# Patient Record
Sex: FEMALE | Race: WHITE | Employment: UNEMPLOYED | URBAN - METROPOLITAN AREA
[De-identification: names, ages, dates, MRNs, and addresses within clinical notes are randomized per-mention and may not be internally consistent; named-entity substitution may affect disease eponyms.]

---

## 2017-11-28 ENCOUNTER — HOSPITAL ENCOUNTER (EMERGENCY)
Age: 32
Discharge: HOME | End: 2017-11-28
Attending: EMERGENCY MEDICINE
Payer: MEDICARE

## 2017-11-28 ENCOUNTER — APPOINTMENT (OUTPATIENT)
Dept: CT IMAGING | Age: 32
End: 2017-11-28
Attending: EMERGENCY MEDICINE
Payer: MEDICARE

## 2017-11-28 VITALS
BODY MASS INDEX: 37.69 KG/M2 | HEIGHT: 68 IN | HEART RATE: 72 BPM | SYSTOLIC BLOOD PRESSURE: 195 MMHG | RESPIRATION RATE: 18 BRPM | OXYGEN SATURATION: 100 % | WEIGHT: 248.68 LBS | DIASTOLIC BLOOD PRESSURE: 135 MMHG | TEMPERATURE: 98.4 F

## 2017-11-28 DIAGNOSIS — S09.90XA CLOSED HEAD INJURY, INITIAL ENCOUNTER: Primary | ICD-10-CM

## 2017-11-28 DIAGNOSIS — S09.8XXA BLUNT HEAD TRAUMA, INITIAL ENCOUNTER: ICD-10-CM

## 2017-11-28 PROCEDURE — 72125 CT NECK SPINE W/O DYE: CPT

## 2017-11-28 PROCEDURE — 74011250637 HC RX REV CODE- 250/637: Performed by: EMERGENCY MEDICINE

## 2017-11-28 PROCEDURE — 70450 CT HEAD/BRAIN W/O DYE: CPT

## 2017-11-28 PROCEDURE — 99284 EMERGENCY DEPT VISIT MOD MDM: CPT

## 2017-11-28 PROCEDURE — 99283 EMERGENCY DEPT VISIT LOW MDM: CPT

## 2017-11-28 RX ORDER — BUTALBITAL, ACETAMINOPHEN AND CAFFEINE 300; 40; 50 MG/1; MG/1; MG/1
1 CAPSULE ORAL
Qty: 10 CAP | Refills: 0 | Status: SHIPPED | OUTPATIENT
Start: 2017-11-28

## 2017-11-28 RX ORDER — GABAPENTIN 300 MG/1
300 CAPSULE ORAL 2 TIMES DAILY
COMMUNITY

## 2017-11-28 RX ORDER — BUTALBITAL, ACETAMINOPHEN AND CAFFEINE 50; 325; 40 MG/1; MG/1; MG/1
2 TABLET ORAL
Status: COMPLETED | OUTPATIENT
Start: 2017-11-28 | End: 2017-11-28

## 2017-11-28 RX ORDER — METOCLOPRAMIDE 10 MG/1
10 TABLET ORAL
Qty: 12 TAB | Refills: 0 | Status: SHIPPED | OUTPATIENT
Start: 2017-11-28

## 2017-11-28 RX ORDER — HYDROCHLOROTHIAZIDE 25 MG/1
25 TABLET ORAL DAILY
COMMUNITY

## 2017-11-28 RX ADMIN — BUTALBITAL, ACETAMINOPHEN AND CAFFEINE 2 TABLET: 50; 325; 40 TABLET ORAL at 22:37

## 2017-11-28 NOTE — LETTER
[address] 
\[facility]\"" EMERGENCY DEPT 
[address] [place] [address]. [address] [id number] [phone] Work/School Note Date: 11/28/2017 To Whom It May concern: 
 
Skye Soto was seen and treated today in the emergency room by the following provider(s): 
Attending Provider: Tyrel Ames MD.   
 
Skye Soto may return to work on 11/30/17.  
 
Sincerely, 
 
 
 
 
Tyrel Ames MD

## 2017-11-29 NOTE — ED NOTES
Patient unable to void at this time stating \"I just went to the bathroom right before I got here. \" Patient states she will let RN know when she is able to provide urine sample. ER MD made aware patient is back in ED room at this time and is requesting medication from headache.

## 2017-11-29 NOTE — DISCHARGE INSTRUCTIONS
Learning About a Closed Head Injury  What is a closed head injury? A closed head injury happens when your head gets hit hard. The strong force of the blow causes your brain to shake in your skull. This movement can cause the brain to bruise, swell, or tear. Sometimes nerves or blood vessels also get damaged. This can cause bleeding in or around the brain. A concussion is a type of closed head injury. What are the symptoms? If you have a mild concussion, you may have a mild headache or feel \"not quite right. \" These symptoms are common. They usually go away over a few days to 4 weeks. But sometimes after a concussion, you feel like you can't function as well as before the injury. And you have new symptoms. This is called postconcussive syndrome. You may:  · Find it harder to solve problems, think, concentrate, or remember. · Have headaches. · Have changes in your sleep patterns, such as not being able to sleep or sleeping all the time. · Have changes in your personality. · Not be interested in your usual activities. · Feel angry or anxious without a clear reason. · Lose your sense of taste or smell. · Be dizzy, lightheaded, or unsteady. It may be hard to stand or walk. How is a closed head injury treated? Any person who may have a concussion needs to see a doctor. Some people have to stay in the hospital to be watched. Others can go home safely. If you go home, follow your doctor's instructions. He or she will tell you if you need someone to watch you closely for the next 24 hours or longer. Rest is the best treatment. Get plenty of sleep at night. And try to rest during the day. · Avoid activities that are physically or mentally demanding. These include housework, exercise, and schoolwork. And don't play video games, send text messages, or use the computer. You may need to change your school or work schedule to be able to avoid these activities.   · Ask your doctor when it's okay to drive, ride a bike, or operate machinery. · Take an over-the-counter pain medicine, such as acetaminophen (Tylenol), ibuprofen (Advil, Motrin), or naproxen (Aleve). Be safe with medicines. Read and follow all instructions on the label. · Check with your doctor before you use any other medicines for pain. · Do not drink alcohol or use illegal drugs. They can slow recovery. They can also increase your risk of getting a second head injury. Follow-up care is a key part of your treatment and safety. Be sure to make and go to all appointments, and call your doctor if you are having problems. It's also a good idea to know your test results and keep a list of the medicines you take. Where can you learn more? Go to http://annmarie-beth.info/. Enter E235 in the search box to learn more about \"Learning About a Closed Head Injury. \"  Current as of: October 14, 2016  Content Version: 11.4  © 8495-9206 [employer]. Care instructions adapted under license by [employer] (which disclaims liability or warranty for this information). If you have questions about a medical condition or this instruction, always ask your healthcare professional. [place] [address] any warranty or liability for your use of this information. Concussion: Care Instructions  Your Care Instructions    A concussion is a kind of injury to the brain. It happens when the head receives a hard blow. The impact can jar or shake the brain against the skull. This interrupts the brain's normal activities. Although you may have cuts or bruises on your head or face, you may have no other visible signs of a brain injury. In most cases, damage to the brain from a concussion can't be seen in tests such as a CT or MRI scan. For a few weeks, you may have low energy, dizziness, trouble sleeping, a headache, ringing in your ears, or nausea. You may also feel anxious, grumpy, or depressed.  You may have problems with memory and concentration. These symptoms are common after a concussion. They should slowly improve over time. Sometimes this takes weeks or even months. Someone who lives with you should know how to care for you. Please share this and all information with a caregiver who will be available to help if needed. Follow-up care is a key part of your treatment and safety. Be sure to make and go to all appointments, and call your doctor if you are having problems. It's also a good idea to know your test results and keep a list of the medicines you take. How can you care for yourself at home? Pain control  · Put ice or a cold pack on the part of your head that hurts for 10 to 20 minutes at a time. Put a thin cloth between the ice and your skin. · Be safe with medicines. Read and follow all instructions on the label. ¨ If the doctor gave you a prescription medicine for pain, take it as prescribed. ¨ If you are not taking a prescription pain medicine, ask your doctor if you can take an over-the-counter medicine. Recovery  · Follow your doctor's instructions. He or she will tell you if you need someone to watch you closely for the next 24 hours or longer. · Rest is the best way to recover from a concussion. You need to rest your body and your brain:  ¨ Get plenty of sleep at night. And take rest breaks during the day. ¨ Avoid activities that take a lot of physical or mental work. This includes housework, exercise, schoolwork, video games, text messaging, and using the computer. ¨ You may need to change your school or work schedule while you recover. ¨ Return to your normal activities slowly. Do not try to do too much at once. · Do not drink alcohol or use illegal drugs. Alcohol and illegal drugs can slow your recovery. And they can increase your risk of a second brain injury. · Avoid activities that could lead to another concussion. Follow your doctor's instructions for a gradual return to activity and sports.   · Ask your doctor when it's okay for you to drive a car, ride a bike, or operate machinery. How should you return to activity? Your return to sports or activity should be gradual. It should only begin when all symptoms of a concussion are gone, both while at rest and during exercise or exertion. Doctors and concussion specialists suggest steps to follow for returning to sports after a concussion. Use these steps as a guide. You should slowly progress through the following levels of activity:  1. No activity. This means complete physical and mental rest.  2. Light aerobic activity. This can include walking, swimming, or other exercise at less than 70% of maximum heart rate. No resistance training is included in this step. 3. Sport-specific exercise. This includes running drills or skating drills (depending on the sport), but no head impact. 4. Noncontact training drills. This includes more complex training drills such as passing. The athlete may also begin light resistance training. 5. Full-contact practice. The athlete can participate in normal training. 6. Return to normal game play. This is the final step and allows the athlete to join in normal game play. Watch and keep track of your progress. It should take at least 6 days for you to go from light activity to normal game play. Make sure that you can stay at each new level of activity for at least 24 hours without symptoms, or as long as your doctor says, before doing more. If one or more symptoms come back, return to a lower level of activity for at least 24 hours. Don't move on until all symptoms are gone. When should you call for help? Call 911 anytime you think you may need emergency care. For example, call if:  ? · You have a seizure. ? · You passed out (lost consciousness). ? · You are confused or can't stay awake. ?Call your doctor now or seek immediate medical care if:  ? · You have new or worse vomiting. ? · You feel less alert.    ? · You have new weakness or numbness in any part of your body. ? Watch closely for changes in your health, and be sure to contact your doctor if:  ? · You do not get better as expected. ? · You have new symptoms, such as headaches, trouble concentrating, or changes in mood. Where can you learn more? Go to http://annmarie-beth.info/. Enter K576 in the search box to learn more about \"Concussion: Care Instructions. \"  Current as of: October 14, 2016  Content Version: 11.4  © 6156-3899 [employer]. Care instructions adapted under license by [employer] (which disclaims liability or warranty for this information).  If you have questions about a medical condition or this instruction, always ask your healthcare professional. [address] any warranty or liability for your use of this information.

## 2017-11-29 NOTE — ED NOTES
MD Devendra Rose gave verbal order to cancel urine order at this time and for headache medication. Order changes to follow.

## 2017-11-29 NOTE — ED NOTES
Discharge instructions reviewed with pt and copy given along with RX by ER MD Daiana Whatley. Patient ambulatory from ED accompanied by friend in no sign of distress or discomfort at this time.

## 2017-11-29 NOTE — ED PROVIDER NOTES
EMERGENCY DEPARTMENT HISTORY AND PHYSICAL EXAM      Date: 11/28/2017  Patient Name: Francie Agustin    History of Presenting Illness     Chief Complaint   Patient presents with    Head Injury     Pt reports a 10 ft piece of wood fell on her head today around 1800. Pt denies being on blood thinners. Pt reports since then, her head feels like pressure \"feels like a migraine\". Pt reports having N/V since and \"seeing double\". Pt ambulatory to [facility] with steady gait. History Provided By: Patient    HPI: Francie Agustin is a 28 y.o. female, pmhx Hypothyroid, who presents ambulatory to the ED c/o forehead contusion and headache s/p a wooden board 3x3x10 inches fell on her forehead from above. Pt notes associated mild photophobia, dizziness, nausea and vomiting She denies taking any medications for relief of symptoms or any other relieving or exacerbating factors. Pt specifically denies any fever, congestion, cough, shortness of breath, chest pain, abdominal pain, nausea, vomiting, diarrhea, dysuria, or urinary frequency. PCP: None    PMHx: Significant for Hypothyroid  PSHx: Significant for none  Social Hx: -tobacco, -EtOH, -Illicit Drugs    There are no other complaints, changes, or physical findings at this time. Current Outpatient Prescriptions   Medication Sig Dispense Refill    gabapentin (NEURONTIN) 300 mg capsule Take 300 mg by mouth two (2) times a day.  hydroCHLOROthiazide (HYDRODIURIL) 25 mg tablet Take 25 mg by mouth daily.  butalbital-acetaminophen-caff (FIORICET) -40 mg per capsule Take 1 Cap by mouth every four (4) hours as needed for Pain. Max Daily Amount: 6 Caps. 10 Cap 0    metoclopramide HCl (REGLAN) 10 mg tablet Take 1 Tab by mouth every six (6) hours as needed.  12 Tab 0       Past History     Past Medical History:  Past Medical History:   Diagnosis Date    Genital herpes, unspecified     pt reports \"maybe now\"     Thyroid activity decreased     as a teen hypothyroidism       Past Surgical History:  History reviewed. No pertinent surgical history. Family History:  Family History   Problem Relation Age of Onset    Muscular dystrophy Other        Social History:  Social History   Substance Use Topics    Smoking status: Never Smoker    Smokeless tobacco: Never Used    Alcohol use No       Allergies: Allergies   Allergen Reactions    Erythromycin Swelling    Penicillins Swelling    Sulfa (Sulfonamide Antibiotics) Swelling         Review of Systems   Review of Systems   Constitutional: Negative. Negative for fever. Eyes: Positive for photophobia. Respiratory: Negative. Negative for shortness of breath. Cardiovascular: Negative for chest pain. Gastrointestinal: Positive for nausea and vomiting. Negative for abdominal pain. Endocrine: Negative. Genitourinary: Negative. Negative for difficulty urinating, dysuria and hematuria. Musculoskeletal: Negative. Skin: Negative. Allergic/Immunologic: Negative. Neurological: Positive for dizziness and headaches. Psychiatric/Behavioral: Negative for suicidal ideas. All other systems reviewed and are negative. Physical Exam   Physical Exam   Constitutional: She is oriented to person, place, and time. She appears well-developed and well-nourished. No distress. HENT:   Head: Normocephalic. Head is with abrasion and with contusion. Nose: Nose normal.   Eyes: Conjunctivae and EOM are normal. No scleral icterus. Neck: Normal range of motion. No tracheal deviation present. Cardiovascular: Normal rate, regular rhythm, normal heart sounds and intact distal pulses. Exam reveals no friction rub. No murmur heard. Pulmonary/Chest: Effort normal and breath sounds normal. No stridor. No respiratory distress. She has no wheezes. She has no rales. Abdominal: Soft. Bowel sounds are normal. She exhibits no distension. There is no tenderness. There is no rebound.    Musculoskeletal: Normal range of motion. She exhibits no tenderness. Neurological: She is alert and oriented to person, place, and time. No cranial nerve deficit. Skin: Skin is warm and dry. No rash noted. She is not diaphoretic. Psychiatric: She has a normal mood and affect. Her speech is normal and behavior is normal. Judgment and thought content normal. Cognition and memory are normal.   Nursing note and vitals reviewed. Diagnostic Study Results     Labs -   No results found for this or any previous visit (from the past 12 hour(s)). Radiologic Studies -   CT SPINE CERV WO CONT   Final Result      CT HEAD WO CONT   Final Result        CT Results  (Last 48 hours)               11/28/17 2159  CT HEAD WO CONT Final result    Impression:  IMPRESSION:        Normal CT scan of the head               Narrative:  EXAM:  CT HEAD WO CONT   Clinical history: Mild head injury, piece of wood fell on head   INDICATION:   Head injury mild or moderate acute, no neurological deficit       COMPARISON: None. CONTRAST:  None. TECHNIQUE: Unenhanced CT of the head was performed using 5 mm images. Brain and   bone windows were generated. CT dose reduction was achieved through use of a   standardized protocol tailored for this examination and automatic exposure   control for dose modulation. FINDINGS:   The ventricles and sulci are normal in size, shape and configuration and   midline. There is no significant white matter disease. There is no intracranial   hemorrhage, extra-axial collection, mass, mass effect or midline shift. The   basilar cisterns are open. No acute infarct is identified. The bone windows   demonstrate no abnormalities. The visualized portions of the paranasal sinuses   and mastoid air cells are clear. 11/28/17 2159  CT SPINE CERV WO CONT Final result    Impression:  IMPRESSION:   No evidence of fracture or dislocation.                Narrative:  EXAM:  CT CERVICAL SPINE WITHOUT CONTRAST HISTORY: Blunt injury, please avoid fell on head   INDICATION:   blunt injury. COMPARISON: None. CONTRAST:  None. TECHNIQUE: Multislice helical CT of the cervical spine was performed without   intravenous contrast administration. Sagittal and coronal reconstructions were   generated. CT dose reduction was achieved through use of a standardized   protocol tailored for this examination and automatic exposure control for dose   modulation. FINDINGS:       The alignment is within normal limits. There is no fracture or subluxation. The   odontoid process is intact. The craniocervical junction is within normal limits. The prevertebral soft tissues are within normal limits. C2-C3:  There is no spinal canal or neural foraminal stenosis. C3-C4:  There is no spinal canal or neural foraminal stenosis. C4-C5:  There is no spinal canal or neural foraminal stenosis. C5-C6:  There is no spinal canal or neural foraminal stenosis. C6-C7:  There is no spinal canal or neural foraminal stenosis. C7-T1:  There is no spinal canal or neural foraminal stenosis. CXR Results  (Last 48 hours)    None            Medical Decision Making   I am the first provider for this patient. I reviewed the vital signs, available nursing notes, past medical history, past surgical history, family history and social history. Vital Signs-Reviewed the patient's vital signs. Patient Vitals for the past 12 hrs:   Temp Pulse Resp BP SpO2   11/28/17 2042 98.4 °F (36.9 °C) 72 18 (!) 195/135 100 %       Pulse Oximetry Analysis - 100% on RA    Cardiac Monitor:   Rate: 72 bpm  Rhythm: Normal Sinus Rhythm      Records Reviewed: Nursing Notes and Old Medical Records    Provider Notes (Medical Decision Making):     DDX:  Aidan Grahamer, contusion, concussion    Plan:  Head and c spine ct, analgesic    Impression:  Close head injury, blunt injury    ED Course:   Initial assessment performed.  The patients presenting problems have been discussed, and they are in agreement with the care plan formulated and outlined with them. I have encouraged them to ask questions as they arise throughout their visit. I reviewed our electronic medical record system for any past medical records that were available that may contribute to the patients current condition, the nursing notes and and vital signs from today's visit    Nursing notes will be reviewed as they become available in realtime while the pt has been in the ED. Anne Cardoza MD    I personally reviewed pt's imaging. Official read by radiology listed below. Anne Cardoza MD      10:41 PM  Progress note:  Discussed imaging findings with pt, specifically noting no evidence of intracranial hemorrhage or c spine injury. Pt will follow up as instructed. All questions have been answered, pt voiced understanding and agreement with plan. If narcotics were prescribed, pt was advised not to drive or operate heavy machinery. If abx were prescribed, pt advised that diarrhea and rash are possible side effects of the medications. Specific return precautions provided in addition to instructions for pt to return to the ED immediately should sx worsen at any time. Anne Cardoza MD      PLAN:  1. Current Discharge Medication List      START taking these medications    Details   butalbital-acetaminophen-caff (FIORICET) -40 mg per capsule Take 1 Cap by mouth every four (4) hours as needed for Pain. Max Daily Amount: 6 Caps. Qty: 10 Cap, Refills: 0      metoclopramide HCl (REGLAN) 10 mg tablet Take 1 Tab by mouth every six (6) hours as needed. Qty: 12 Tab, Refills: 0           2.    Follow-up Information     Follow up With Details Comments Contact Info    [facility] EMERGENCY DEPT  As needed [address] [place] [address]  [phone]    Your PCP  Schedule an appointment as soon as possible for a visit in 2 days          Return to ED if worse Disposition:    Discharge Note:  10:41 PM  The pt is ready for discharge. The pt's signs, symptoms, diagnosis, and discharge instructions have been discussed and pt has conveyed their understanding. The pt is to follow up as recommended or return to ER should their symptoms worsen. Plan has been discussed and pt is in agreement. Diagnosis     Clinical Impression:   1. Closed head injury, initial encounter    2. Blunt head trauma, initial encounter        Attestations: This note is prepared by Akua Benítez, acting as Scribe for Mendel Branch, MD Mendel Branch, MD : The scribe's documentation has been prepared under my direction and personally reviewed by me in its entirety. I confirm that the note above accurately reflects all work, treatment, procedures, and medical decision making performed by me. This note will not be viewable in [address]

## 2019-01-28 LAB
CHLAMYDIA, EXTERNAL: NORMAL
HBSAG, EXTERNAL: NORMAL
HIV, EXTERNAL: NONREACTIVE
N. GONORRHEA, EXTERNAL: NORMAL
RUBELLA, EXTERNAL: 1.58

## 2019-06-14 LAB — ANTIBODY SCREEN, EXTERNAL: NORMAL

## 2019-06-26 LAB — GRBS, EXTERNAL: NORMAL

## 2019-08-16 ENCOUNTER — HOSPITAL ENCOUNTER (INPATIENT)
Age: 34
LOS: 2 days | Discharge: HOME | End: 2019-08-18
Attending: OBSTETRICS & GYNECOLOGY | Admitting: OBSTETRICS & GYNECOLOGY
Payer: MEDICARE

## 2019-08-16 ENCOUNTER — ANESTHESIA EVENT (OUTPATIENT)
Dept: LABOR AND DELIVERY | Age: 34
End: 2019-08-16
Payer: MEDICARE

## 2019-08-16 ENCOUNTER — ANESTHESIA (OUTPATIENT)
Dept: LABOR AND DELIVERY | Age: 34
End: 2019-08-16
Payer: MEDICARE

## 2019-08-16 PROBLEM — Z98.891 PREVIOUS CESAREAN SECTION: Status: ACTIVE | Noted: 2019-08-16

## 2019-08-16 LAB
ABO + RH BLD: NORMAL
ALBUMIN SERPL-MCNC: 3.2 G/DL (ref 3.5–5)
ALBUMIN/GLOB SERPL: 0.7 {RATIO} (ref 1.1–2.2)
ALP SERPL-CCNC: 112 U/L (ref 45–117)
ALT SERPL-CCNC: 17 U/L (ref 12–78)
ANION GAP SERPL CALC-SCNC: 8 MMOL/L (ref 5–15)
AST SERPL-CCNC: 22 U/L (ref 15–37)
BASOPHILS # BLD: 0.1 K/UL (ref 0–0.1)
BASOPHILS NFR BLD: 1 % (ref 0–1)
BILIRUB SERPL-MCNC: 0.4 MG/DL (ref 0.2–1)
BLOOD GROUP ANTIBODIES SERPL: NORMAL
BUN SERPL-MCNC: 10 MG/DL (ref 6–20)
BUN/CREAT SERPL: 15 (ref 12–20)
CALCIUM SERPL-MCNC: 9.7 MG/DL (ref 8.5–10.1)
CHLORIDE SERPL-SCNC: 107 MMOL/L (ref 97–108)
CO2 SERPL-SCNC: 23 MMOL/L (ref 21–32)
CREAT SERPL-MCNC: 0.65 MG/DL (ref 0.55–1.02)
DIFFERENTIAL METHOD BLD: ABNORMAL
EOSINOPHIL # BLD: 0.2 K/UL (ref 0–0.4)
EOSINOPHIL NFR BLD: 2 % (ref 0–7)
ERYTHROCYTE [DISTWIDTH] IN BLOOD BY AUTOMATED COUNT: 14.6 % (ref 11.5–14.5)
GLOBULIN SER CALC-MCNC: 4.8 G/DL (ref 2–4)
GLUCOSE SERPL-MCNC: 87 MG/DL (ref 65–100)
HCT VFR BLD AUTO: 38.2 % (ref 35–47)
HGB BLD-MCNC: 12.5 G/DL (ref 11.5–16)
IMM GRANULOCYTES # BLD AUTO: 0.2 K/UL (ref 0–0.04)
IMM GRANULOCYTES NFR BLD AUTO: 1 % (ref 0–0.5)
LYMPHOCYTES # BLD: 2.1 K/UL (ref 0.8–3.5)
LYMPHOCYTES NFR BLD: 16 % (ref 12–49)
MCH RBC QN AUTO: 28.2 PG (ref 26–34)
MCHC RBC AUTO-ENTMCNC: 32.7 G/DL (ref 30–36.5)
MCV RBC AUTO: 86 FL (ref 80–99)
MONOCYTES # BLD: 0.9 K/UL (ref 0–1)
MONOCYTES NFR BLD: 7 % (ref 5–13)
NEUTS SEG # BLD: 10.1 K/UL (ref 1.8–8)
NEUTS SEG NFR BLD: 73 % (ref 32–75)
NRBC # BLD: 0 K/UL (ref 0–0.01)
NRBC BLD-RTO: 0 PER 100 WBC
PLATELET # BLD AUTO: 291 K/UL (ref 150–400)
PMV BLD AUTO: 11.9 FL (ref 8.9–12.9)
POTASSIUM SERPL-SCNC: 4.1 MMOL/L (ref 3.5–5.1)
PROT SERPL-MCNC: 8 G/DL (ref 6.4–8.2)
RBC # BLD AUTO: 4.44 M/UL (ref 3.8–5.2)
SODIUM SERPL-SCNC: 138 MMOL/L (ref 136–145)
SPECIMEN EXP DATE BLD: NORMAL
WBC # BLD AUTO: 13.6 K/UL (ref 3.6–11)

## 2019-08-16 PROCEDURE — 77010026065 HC OXYGEN MINIMUM MEDICAL AIR

## 2019-08-16 PROCEDURE — 85025 COMPLETE CBC W/AUTO DIFF WBC: CPT

## 2019-08-16 PROCEDURE — 76010000392 HC C SECN EA ADDL 0.5 HR: Performed by: OBSTETRICS & GYNECOLOGY

## 2019-08-16 PROCEDURE — 86900 BLOOD TYPING SEROLOGIC ABO: CPT

## 2019-08-16 PROCEDURE — 75410000003 HC RECOV DEL/VAG/CSECN EA 0.5 HR

## 2019-08-16 PROCEDURE — 74011250636 HC RX REV CODE- 250/636: Performed by: NURSE ANESTHETIST, CERTIFIED REGISTERED

## 2019-08-16 PROCEDURE — 65410000002 HC RM PRIVATE OB

## 2019-08-16 PROCEDURE — 75410000002 HC LABOR FEE PER 1 HR

## 2019-08-16 PROCEDURE — 74011250636 HC RX REV CODE- 250/636

## 2019-08-16 PROCEDURE — 76060000033 HC ANESTHESIA 1 TO 1.5 HR: Performed by: OBSTETRICS & GYNECOLOGY

## 2019-08-16 PROCEDURE — 74011000258 HC RX REV CODE- 258: Performed by: OBSTETRICS & GYNECOLOGY

## 2019-08-16 PROCEDURE — 76010000391 HC C SECN FIRST 1 HR: Performed by: OBSTETRICS & GYNECOLOGY

## 2019-08-16 PROCEDURE — 74011000250 HC RX REV CODE- 250: Performed by: NURSE ANESTHETIST, CERTIFIED REGISTERED

## 2019-08-16 PROCEDURE — 80053 COMPREHEN METABOLIC PANEL: CPT

## 2019-08-16 PROCEDURE — 36415 COLL VENOUS BLD VENIPUNCTURE: CPT

## 2019-08-16 PROCEDURE — 76060000078 HC EPIDURAL ANESTHESIA: Performed by: OBSTETRICS & GYNECOLOGY

## 2019-08-16 PROCEDURE — 74011250636 HC RX REV CODE- 250/636: Performed by: OBSTETRICS & GYNECOLOGY

## 2019-08-16 PROCEDURE — 77030007866 HC KT SPN ANES BBMI -B: Performed by: ANESTHESIOLOGY

## 2019-08-16 RX ORDER — ONDANSETRON 2 MG/ML
INJECTION INTRAMUSCULAR; INTRAVENOUS
Status: COMPLETED
Start: 2019-08-16 | End: 2019-08-16

## 2019-08-16 RX ORDER — SODIUM CHLORIDE 0.9 % (FLUSH) 0.9 %
5-40 SYRINGE (ML) INJECTION AS NEEDED
Status: DISCONTINUED | OUTPATIENT
Start: 2019-08-16 | End: 2019-08-16 | Stop reason: SDUPTHER

## 2019-08-16 RX ORDER — NALOXONE HYDROCHLORIDE 0.4 MG/ML
0.4 INJECTION, SOLUTION INTRAMUSCULAR; INTRAVENOUS; SUBCUTANEOUS AS NEEDED
Status: DISCONTINUED | OUTPATIENT
Start: 2019-08-16 | End: 2019-08-16 | Stop reason: SDUPTHER

## 2019-08-16 RX ORDER — DIPHENHYDRAMINE HYDROCHLORIDE 50 MG/ML
12.5 INJECTION, SOLUTION INTRAMUSCULAR; INTRAVENOUS
Status: DISCONTINUED | OUTPATIENT
Start: 2019-08-16 | End: 2019-08-18 | Stop reason: HOSPADM

## 2019-08-16 RX ORDER — EPHEDRINE SULFATE/0.9% NACL/PF 50 MG/5 ML
SYRINGE (ML) INTRAVENOUS AS NEEDED
Status: DISCONTINUED | OUTPATIENT
Start: 2019-08-16 | End: 2019-08-16 | Stop reason: HOSPADM

## 2019-08-16 RX ORDER — SODIUM CHLORIDE 0.9 % (FLUSH) 0.9 %
5-40 SYRINGE (ML) INJECTION EVERY 8 HOURS
Status: DISCONTINUED | OUTPATIENT
Start: 2019-08-16 | End: 2019-08-18 | Stop reason: HOSPADM

## 2019-08-16 RX ORDER — IBUPROFEN 400 MG/1
800 TABLET ORAL EVERY 8 HOURS
Status: DISCONTINUED | OUTPATIENT
Start: 2019-08-16 | End: 2019-08-18 | Stop reason: HOSPADM

## 2019-08-16 RX ORDER — OMEPRAZOLE 10 MG/1
10 CAPSULE, DELAYED RELEASE ORAL DAILY
COMMUNITY

## 2019-08-16 RX ORDER — BUPIVACAINE HYDROCHLORIDE 7.5 MG/ML
INJECTION, SOLUTION EPIDURAL; RETROBULBAR
Status: COMPLETED | OUTPATIENT
Start: 2019-08-16 | End: 2019-08-16

## 2019-08-16 RX ORDER — OXYTOCIN 10 [USP'U]/ML
INJECTION, SOLUTION INTRAMUSCULAR; INTRAVENOUS AS NEEDED
Status: DISCONTINUED | OUTPATIENT
Start: 2019-08-16 | End: 2019-08-16 | Stop reason: HOSPADM

## 2019-08-16 RX ORDER — ERGOCALCIFEROL 1.25 MG/1
50000 CAPSULE ORAL
COMMUNITY

## 2019-08-16 RX ORDER — ONDANSETRON 2 MG/ML
4 INJECTION INTRAMUSCULAR; INTRAVENOUS
Status: DISCONTINUED | OUTPATIENT
Start: 2019-08-16 | End: 2019-08-18 | Stop reason: HOSPADM

## 2019-08-16 RX ORDER — ZOLPIDEM TARTRATE 5 MG/1
5 TABLET ORAL
Status: DISCONTINUED | OUTPATIENT
Start: 2019-08-16 | End: 2019-08-18 | Stop reason: HOSPADM

## 2019-08-16 RX ORDER — SODIUM CHLORIDE, SODIUM LACTATE, POTASSIUM CHLORIDE, CALCIUM CHLORIDE 600; 310; 30; 20 MG/100ML; MG/100ML; MG/100ML; MG/100ML
1000 INJECTION, SOLUTION INTRAVENOUS CONTINUOUS
Status: DISCONTINUED | OUTPATIENT
Start: 2019-08-16 | End: 2019-08-16 | Stop reason: HOSPADM

## 2019-08-16 RX ORDER — SODIUM CHLORIDE, SODIUM LACTATE, POTASSIUM CHLORIDE, CALCIUM CHLORIDE 600; 310; 30; 20 MG/100ML; MG/100ML; MG/100ML; MG/100ML
125 INJECTION, SOLUTION INTRAVENOUS CONTINUOUS
Status: DISCONTINUED | OUTPATIENT
Start: 2019-08-16 | End: 2019-08-18 | Stop reason: HOSPADM

## 2019-08-16 RX ORDER — SIMETHICONE 80 MG
80 TABLET,CHEWABLE ORAL AS NEEDED
Status: DISCONTINUED | OUTPATIENT
Start: 2019-08-16 | End: 2019-08-18 | Stop reason: HOSPADM

## 2019-08-16 RX ORDER — KETOROLAC TROMETHAMINE 30 MG/ML
30 INJECTION, SOLUTION INTRAMUSCULAR; INTRAVENOUS
Status: ACTIVE | OUTPATIENT
Start: 2019-08-16 | End: 2019-08-17

## 2019-08-16 RX ORDER — SODIUM CHLORIDE 0.9 % (FLUSH) 0.9 %
5-40 SYRINGE (ML) INJECTION EVERY 8 HOURS
Status: DISCONTINUED | OUTPATIENT
Start: 2019-08-16 | End: 2019-08-16 | Stop reason: HOSPADM

## 2019-08-16 RX ORDER — CLINDAMYCIN PHOSPHATE 900 MG/50ML
900 INJECTION INTRAVENOUS
Status: COMPLETED | OUTPATIENT
Start: 2019-08-16 | End: 2019-08-16

## 2019-08-16 RX ORDER — NALOXONE HYDROCHLORIDE 0.4 MG/ML
0.2 INJECTION, SOLUTION INTRAMUSCULAR; INTRAVENOUS; SUBCUTANEOUS
Status: ACTIVE | OUTPATIENT
Start: 2019-08-16 | End: 2019-08-17

## 2019-08-16 RX ORDER — LABETALOL 200 MG/1
200 TABLET, FILM COATED ORAL EVERY 12 HOURS
Status: DISCONTINUED | OUTPATIENT
Start: 2019-08-17 | End: 2019-08-18 | Stop reason: HOSPADM

## 2019-08-16 RX ORDER — LABETALOL 200 MG/1
200 TABLET, FILM COATED ORAL 2 TIMES DAILY
Status: DISCONTINUED | OUTPATIENT
Start: 2019-08-17 | End: 2019-08-17

## 2019-08-16 RX ORDER — ONDANSETRON 2 MG/ML
INJECTION INTRAMUSCULAR; INTRAVENOUS AS NEEDED
Status: DISCONTINUED | OUTPATIENT
Start: 2019-08-16 | End: 2019-08-16 | Stop reason: HOSPADM

## 2019-08-16 RX ORDER — LABETALOL 200 MG/1
200 TABLET, FILM COATED ORAL 2 TIMES DAILY
COMMUNITY

## 2019-08-16 RX ORDER — KETOROLAC TROMETHAMINE 30 MG/ML
30 INJECTION, SOLUTION INTRAMUSCULAR; INTRAVENOUS
Status: DISCONTINUED | OUTPATIENT
Start: 2019-08-16 | End: 2019-08-16 | Stop reason: SDUPTHER

## 2019-08-16 RX ORDER — MORPHINE SULFATE 1 MG/ML
INJECTION, SOLUTION EPIDURAL; INTRATHECAL; INTRAVENOUS
Status: COMPLETED | OUTPATIENT
Start: 2019-08-16 | End: 2019-08-16

## 2019-08-16 RX ORDER — SODIUM CHLORIDE 0.9 % (FLUSH) 0.9 %
5-40 SYRINGE (ML) INJECTION AS NEEDED
Status: DISCONTINUED | OUTPATIENT
Start: 2019-08-16 | End: 2019-08-16 | Stop reason: HOSPADM

## 2019-08-16 RX ORDER — PHENYLEPHRINE HCL IN 0.9% NACL 0.4MG/10ML
SYRINGE (ML) INTRAVENOUS AS NEEDED
Status: DISCONTINUED | OUTPATIENT
Start: 2019-08-16 | End: 2019-08-16 | Stop reason: HOSPADM

## 2019-08-16 RX ORDER — SODIUM CHLORIDE, SODIUM LACTATE, POTASSIUM CHLORIDE, CALCIUM CHLORIDE 600; 310; 30; 20 MG/100ML; MG/100ML; MG/100ML; MG/100ML
INJECTION, SOLUTION INTRAVENOUS
Status: DISCONTINUED | OUTPATIENT
Start: 2019-08-16 | End: 2019-08-16 | Stop reason: HOSPADM

## 2019-08-16 RX ORDER — OXYCODONE AND ACETAMINOPHEN 5; 325 MG/1; MG/1
1 TABLET ORAL
Status: DISCONTINUED | OUTPATIENT
Start: 2019-08-16 | End: 2019-08-18 | Stop reason: HOSPADM

## 2019-08-16 RX ADMIN — MORPHINE SULFATE 0.5 MG: 1 INJECTION, SOLUTION EPIDURAL; INTRATHECAL; INTRAVENOUS at 10:24

## 2019-08-16 RX ADMIN — SODIUM CHLORIDE, SODIUM LACTATE, POTASSIUM CHLORIDE, AND CALCIUM CHLORIDE 125 ML/HR: 600; 310; 30; 20 INJECTION, SOLUTION INTRAVENOUS at 20:00

## 2019-08-16 RX ADMIN — ONDANSETRON 4 MG: 2 INJECTION INTRAMUSCULAR; INTRAVENOUS at 14:14

## 2019-08-16 RX ADMIN — SODIUM CHLORIDE, POTASSIUM CHLORIDE, SODIUM LACTATE AND CALCIUM CHLORIDE: 600; 310; 30; 20 INJECTION, SOLUTION INTRAVENOUS at 10:00

## 2019-08-16 RX ADMIN — ONDANSETRON HYDROCHLORIDE 4 MG: 2 INJECTION, SOLUTION INTRAMUSCULAR; INTRAVENOUS at 10:22

## 2019-08-16 RX ADMIN — CLINDAMYCIN PHOSPHATE 900 MG: 900 INJECTION, SOLUTION INTRAVENOUS at 09:47

## 2019-08-16 RX ADMIN — SODIUM CHLORIDE, SODIUM LACTATE, POTASSIUM CHLORIDE, AND CALCIUM CHLORIDE 125 ML/HR: 600; 310; 30; 20 INJECTION, SOLUTION INTRAVENOUS at 12:38

## 2019-08-16 RX ADMIN — Medication 10 MG: at 10:32

## 2019-08-16 RX ADMIN — SODIUM CHLORIDE, SODIUM LACTATE, POTASSIUM CHLORIDE, AND CALCIUM CHLORIDE 999 ML/HR: 600; 310; 30; 20 INJECTION, SOLUTION INTRAVENOUS at 08:34

## 2019-08-16 RX ADMIN — ONDANSETRON 4 MG: 2 INJECTION INTRAMUSCULAR; INTRAVENOUS at 18:32

## 2019-08-16 RX ADMIN — BUPIVACAINE HYDROCHLORIDE 12 MG: 7.5 INJECTION, SOLUTION EPIDURAL; RETROBULBAR at 10:24

## 2019-08-16 RX ADMIN — SODIUM CHLORIDE, POTASSIUM CHLORIDE, SODIUM LACTATE AND CALCIUM CHLORIDE: 600; 310; 30; 20 INJECTION, SOLUTION INTRAVENOUS at 10:46

## 2019-08-16 RX ADMIN — PROMETHAZINE HYDROCHLORIDE 12.5 MG: 25 INJECTION INTRAMUSCULAR; INTRAVENOUS at 21:31

## 2019-08-16 RX ADMIN — Medication 10 MG: at 10:30

## 2019-08-16 RX ADMIN — Medication 80 MCG: at 10:29

## 2019-08-16 RX ADMIN — SODIUM CHLORIDE, SODIUM LACTATE, POTASSIUM CHLORIDE, AND CALCIUM CHLORIDE 999 ML/HR: 600; 310; 30; 20 INJECTION, SOLUTION INTRAVENOUS at 09:29

## 2019-08-16 RX ADMIN — OXYTOCIN 20 UNITS: 10 INJECTION, SOLUTION INTRAMUSCULAR; INTRAVENOUS at 10:46

## 2019-08-16 NOTE — PROGRESS NOTES
TRANSFER - OUT REPORT:    Verbal report given to LORETO Alva RN(name) on Gifty Lawrence  being transferred to MIU(unit) for routine progression of care       Report consisted of patients Situation, Background, Assessment and   Recommendations(SBAR). Information from the following report(s) SBAR, Intake/Output, MAR and Recent Results was reviewed with the receiving nurse. Lines:   Peripheral IV 08/16/19 Right Wrist (Active)   Site Assessment Clean, dry, & intact 8/16/2019 10:25 AM   Phlebitis Assessment 0 8/16/2019 10:25 AM   Infiltration Assessment 0 8/16/2019 10:25 AM   Dressing Status Clean, dry, & intact 8/16/2019 10:25 AM   Dressing Type Transparent;Tape 8/16/2019 10:25 AM   Hub Color/Line Status Pink; Infusing 8/16/2019 10:25 AM        Opportunity for questions and clarification was provided.

## 2019-08-16 NOTE — ANESTHESIA PROCEDURE NOTES
Spinal Block    Start time: 8/16/2019 10:20 AM  End time: 8/16/2019 10:25 AM  Performed by: Trenton Lindsay CRNA  Authorized by: Trenton Lindsay CRNA     Pre-procedure: Indications: primary anesthetic  Preanesthetic Checklist: patient identified, risks and benefits discussed, anesthesia consent, patient being monitored and timeout performed    Timeout Time: 10:20          Spinal Block:   Patient Position:  Seated  Prep Region:  Lumbar  Prep: DuraPrep      Location:  L3-4  Technique:  Single shot    Local Dose (mL):  3    Needle:   Needle Type:   Era  Needle Gauge:  25 G  Attempts:  1      Events: CSF confirmed, no blood with aspiration and no paresthesia        Assessment:  Insertion:  Uncomplicated  Patient tolerance:  Patient tolerated the procedure well with no immediate complications

## 2019-08-16 NOTE — L&D DELIVERY NOTE
Delivery Summary  Patient: Joesph Jimenez             Circumcision:   declines  Additional Delivery Comments - RCS \"Juan\"      Information for the patient's :  Akash mcintyre [[id number]]       Labor Events:    Labor:      Steroids:     Cervical Ripening Date/Time:       Cervical Ripening Type:     Antibiotics During Labor:     Rupture Identifier:      Rupture Date/Time:       Rupture Type:     Amniotic Fluid Volume:      Amniotic Fluid Description:      Amniotic Fluid Odor:      Induction:         Induction Date/Time:        Indications for Induction:      Augmentation:     Augmentation Date/Time:      Indications for Augmentation:     Labor complications: Additional complications:        Delivery Events:  Indications For Episiotomy:     Episiotomy:     Perineal Laceration(s):     Repaired:     Periurethral Laceration Location:      Repaired:     Labial Laceration Location:     Repaired:     Sulcal Laceration Location:     Repaired:     Vaginal Laceration Location:     Repaired:     Cervical Laceration Location:     Repaired:     Repair Suture:     Number of Repair Packets:     Estimated Blood Loss (ml):  ml     Delivery Date: 2019    Delivery Time: 10:45 AM  Delivery Type: , Low Transverse  Sex:  Male    Gestational Age: 39w0d   Delivery Clinician:  Liza Mohamud  Living Status: Living   Delivery Location: OR            APGARS  One minute Five minutes Ten minutes   Skin color: 1   1        Heart rate: 2   2        Grimace: 2   2        Muscle tone: 2   2        Breathin   2        Totals: 9   9            Presentation: Vertex    Position:        Resuscitation Method:  Tactile Stimulation     Meconium Stained:        Cord Information: 3 Vessels  Complications: None  Cord around:    Delayed cord clamping?     Cord clamped date/time:   Disposition of Cord Blood:      Blood Gases Sent?:      Placenta:  Date/Time: 2019 10:47 AM  Removal: Manual Removal Appearance: Normal;Intact     Kinder Measurements:  Birth Weight: 3.9 kg      Birth Length: 54.6 cm      Head Circumference: 37 cm      Chest Circumference: 35.5 cm     Abdominal Girth: 33 cm    Other Providers:   Jenn Huerta, Obstetrician;Primary Nurse;Primary Kinder Nurse;Nicu Nurse;Neonatologist;Anesthesiologist;Crna;Nurse Practitioner;Midwife;Nursery Nurse           Cord pH:  none    Episiotomy:     Laceration(s):       Estimated Blood Loss (ml):     Labor Events  Method:        Augmentation:     Cervical Ripening:                Hospital Problems  Never Reviewed          Codes Class Noted POA    Previous  section ICD-10-CM: Z98.891  ICD-9-CM: V45.89  2019 Unknown              Operative Vaginal Delivery - none    Group B Strep:   Lab Results   Component Value Date/Time    GrBStrep, External neg 2019     Information for the patient's :  Xiomara Trevor fernandoyn [[id number]]   No results found for: ABORH, PCTABR, PCTDIG, BILI, ABORHEXT, ABORH    No results found for: APH, APCO2, APO2, AHCO3, ABEC, ABDC, O2ST, EPHV, PCO2V, PO2V, HCO3V, EBEV, EBDV, SITE, RSCOM

## 2019-08-16 NOTE — PROGRESS NOTES
Pt arrived to unit for scheduled 10am  with Dr. Mirela Morillo, will place on EFM at this time and begin pre-op process.

## 2019-08-16 NOTE — ANESTHESIA PREPROCEDURE EVALUATION
Relevant Problems   No relevant active problems       Anesthetic History   No history of anesthetic complications            Review of Systems / Medical History  Patient summary reviewed, nursing notes reviewed and pertinent labs reviewed    Pulmonary  Within defined limits                 Neuro/Psych   Within defined limits           Cardiovascular  Within defined limits                Exercise tolerance: >4 METS     GI/Hepatic/Renal  Within defined limits              Endo/Other      Hypothyroidism       Other Findings   Comments: Prev            Physical Exam    Airway  Mallampati: III  TM Distance: 4 - 6 cm  Neck ROM: normal range of motion   Mouth opening: Normal     Cardiovascular  Regular rate and rhythm,  S1 and S2 normal,  no murmur, click, rub, or gallop             Dental  No notable dental hx       Pulmonary  Breath sounds clear to auscultation               Abdominal  GI exam deferred       Other Findings            Anesthetic Plan    ASA: 2  Anesthesia type: spinal            Anesthetic plan and risks discussed with: Patient and Spouse

## 2019-08-16 NOTE — H&P
History & Physical    Name: Fabiola Mcneil MRN: [id number]  SSN: [id number]    YOB: 1985  Age: 29 y.o. Sex: female      Subjective:     Estimated Date of Delivery: 19  OB History    Para Term  AB Living   2 1 1     1   SAB TAB Ectopic Molar Multiple Live Births           0 1      # Outcome Date GA Lbr Abhijit/2nd Weight Sex Delivery Anes PTL Lv   2 Current            1 Term 08/17/15 39w2d  4.095 kg M  LO SPINAL AN N RITU       Ms. Saundra Altamirano admitted with pregnancy at 39w0d for  section due to previous  section. Prenatal course was complicated by chronic hypertension well controlled on Labetalol 200 mg BID. Reassuring fetal testing. Patient did have polyhydramnios on 28 week ultrasound- this resolved over the next 6 weeks. She has a h/o HSV and has been on Valtrex since 36 weeks with no symptoms of outbreak. She has a h/o ankylosing spondylosis and was on [place] during pregnancy. Please see prenatal records for details. Past Medical History:   Diagnosis Date    Genital herpes, unspecified     pt reports \"maybe now\"     Thyroid activity decreased     as a teen hypothyroidism     No past surgical history on file. Social History     Occupational History    Not on file   Tobacco Use    Smoking status: Never Smoker    Smokeless tobacco: Never Used   Substance and Sexual Activity    Alcohol use: No    Drug use: No    Sexual activity: Not on file     Family History   Problem Relation Age of Onset    Muscular dystrophy Other        Allergies   Allergen Reactions    Erythromycin Swelling    Penicillins Swelling    Sulfa (Sulfonamide Antibiotics) Swelling     Prior to Admission medications    Medication Sig Start Date End Date Taking? Authorizing Provider   gabapentin (NEURONTIN) 300 mg capsule Take 300 mg by mouth two (2) times a day. Refugio Trammell MD   hydroCHLOROthiazide (HYDRODIURIL) 25 mg tablet Take 25 mg by mouth daily.     Refugio Trammell MD butalbital-acetaminophen-caff (FIORICET) -40 mg per capsule Take 1 Cap by mouth every four (4) hours as needed for Pain. Max Daily Amount: 6 Caps. 17   Anne Recinos MD   metoclopramide HCl (REGLAN) 10 mg tablet Take 1 Tab by mouth every six (6) hours as needed. 17   Anne Recinos MD        Review of Systems: A comprehensive review of systems was negative except for that written in the History of Present Illness. Objective:     Vitals:  Vitals:    19 0810   BP: (!) 163/98   Pulse: 96   Resp: 20   Temp: 98.3 °F (36.8 °C)   SpO2: 99%   Weight: 108 kg (238 lb)   Height: 5' 10\" (1.778 m)        Physical Exam:  Patient without distress. Heart: Regular rate and rhythm  Lung: clear to auscultation throughout lung fields, no wheezes, no rales, no rhonchi and normal respiratory effort  Abdomen: soft, nontender  Membranes:  Intact  Fetal Heart Rate: Reactive    Prenatal Labs:   Lab Results   Component Value Date/Time    ABO/Rh(D) O POSITIVE 2015 06:27 AM    Rubella, External 1.58 2019    GrBStrep, External neg 2019    HBsAg, External neg 2019    HIV, External nonreactive 2019    Gonorrhea, External neg 2019    Chlamydia, External neg 2019    ABO,Rh O+ 2015         Impression/Plan:     Plan:  Admit for repeat  section. Discussed the risks of surgery including the risks of bleeding, infection, deep vein thrombosis, and surgical injuries to internal organs including but not limited to the bowels, bladder, rectum, and female reproductive organs.  The patient understands the risks; any and all questions were answered to the patient's satisfaction.  -cbc, STBB  -Anaya and SCDs in OR  -Clindamycin 900 mg IV pre-op due to PCN allergy  -continue Labetalol 200 mg BID for CHTN  -ankylosing spondylosis- she has stopped Cemzia and rheumatologist wants her to restart Humira when she goes home- she has medication to restart at home

## 2019-08-16 NOTE — LACTATION NOTE
This note was copied from a baby's chart. P2  Pt will successfully establish breastfeeding by feeding in response to early feeding cues or wake every 3h, will obtain deep latch, and will keep log of feedings/output. Taught to BF at hunger cues and or q 2-3 hrs and to offer 10-20 drops of hand expressed colostrum at any non-feeds. Breast Assessment  Left Breast: Small   Left Nipple: Intact, Everted  Right Breast: Small   Right Nipple: Intact, Everted  Breast- Feeding Assessment  Attends Breast-Feeding Classes: No  Breast-Feeding Experience: Yes(15 mo with first)  Breast Trauma/Surgery: No  Type/Quality: Good     Mother/Infant Observation  Mother Observation: Alignment, Lets baby end feeding, Sleepy after feeding, Breast comfortable, Close hold, Recognizes feeding cues, Thirst  Infant Observation: Audible swallows, Latches nipple and aereolae, Relaxed after feeding, Breast tissue moves, Lips flanged, lower, Rhythmic suck, Feeding cues, Lips flanged, upper  LATCH Documentation  Latch: Grasps breast, tongue down, lips flanged, rhythmic sucking  Audible Swallowing: A few with stimulation  Type of Nipple: Everted (after stimulation)  Comfort (Breast/Nipple): Soft/non-tender  Hold (Positioning): No assist from staff, mother able to position/hold infant  LATCH Score: 9   Reviewed breastfeeding basics:  How milk is made and normal  breastfeeding behaviors discussed. Supply and demand,  stomach size, early feeding cues, skin to skin bonding, positioning and baby led latch-on, assymetrical latch with signs of good, deep latch vs shallow, feeding frequency and duration, and log sheet for tracking infant feedings and output. Breastfeeding Booklet and Warm line information given. Discussed typical  weight loss and the importance of infant weight checks with pediatrician 1 day post discharge. [facility] with NNP CLC's for outpatient support as needed.  New pump from insurance company obtained. Expect success. Call prn.

## 2019-08-16 NOTE — OP NOTES
Operative Note    Name: Cornelio Newton   Medical Record Number: [id number]   YOB: 1985  Today's Date: 2019      Pre-operative Diagnosis: 39 weeks gestation, Prior  desiring Repeat     Post-operative Diagnosis: same    Operation: Repeat Low Transverse  Section    Surgeon(s):  MD Mikey Escobedo MD    Anesthesia: Spinal    Prophylactic Antibiotics: clindamycin or Ancef  DVT Prophylaxis: Sequential Compression Devices         Fetal Description: soliman     Birth Information:   Information for the patient's :  Missy mcintyre [[id number]]   Delivery of a 3.9 kg male infant on 2019 at 10:45 AM. Apgars were 9  and 9 . Umbilical Cord:       Umbilical Cord Events:       Placenta:   removal with   appearance. Amniotic Fluid Volume:        Amniotic Fluid Description:           Placenta:  manual removal    Specimens: none           Complications:  none    EBL: 1000 cc    Procedure Detail:      After proper patient identification and consent, the patient was taken to the operating room, where spinal anesthesia was administered and found to be adequate. Anaya catheter had been placed using sterile technique. The patient was prepped and draped in the normal sterile fashion with SCDs in place. The abdomen was entered using the Pfannenstiel technique. The peritoneum was entered sharply well superior to the bladder without any apparent injury. The Buck self retaining retractor was placed. The bladder flap was created without difficulty. A low transverse uterine incision was made with the scalpel and extended with blunt finger dissection. The babys head was then delivered atraumatically. The nose and mouth were suctioned. The cord was clamped and cut and the baby was handed off to Nursing staff in attendance. Placenta was then removed from the uterus.  The uterus was curettaged with a moist lap pad and cleared of all clots and debris. The uterine incision was closed with 0 vicryl, double layer  in running locking fashion with good hemostasis assured. The anterior pelvis was irrigated with warm normal saline and good hemostasis was again reassured throughout. The retractor was removed. The rectus muscles were reapproximated with 3-0 Vicryl. The fascia was closed with 0 Maxon in a running fashion. Good hemostasis was assured. The skin was closed with absorbable staples in a subcuticular fashion. The patient tolerated the procedure well. Sponge, lap, and needle counts were correct times three and the patient and baby were taken to recovery/postpartum room in stable condition.     Prosper Henry MD  August 16, 2019  11:16 AM

## 2019-08-17 LAB
BASOPHILS # BLD: 0.1 K/UL (ref 0–0.1)
BASOPHILS NFR BLD: 0 % (ref 0–1)
DIFFERENTIAL METHOD BLD: ABNORMAL
EOSINOPHIL # BLD: 0.1 K/UL (ref 0–0.4)
EOSINOPHIL NFR BLD: 1 % (ref 0–7)
ERYTHROCYTE [DISTWIDTH] IN BLOOD BY AUTOMATED COUNT: 14.8 % (ref 11.5–14.5)
HCT VFR BLD AUTO: 34 % (ref 35–47)
HGB BLD-MCNC: 10.9 G/DL (ref 11.5–16)
IMM GRANULOCYTES # BLD AUTO: 0.1 K/UL (ref 0–0.04)
IMM GRANULOCYTES NFR BLD AUTO: 1 % (ref 0–0.5)
LYMPHOCYTES # BLD: 1.9 K/UL (ref 0.8–3.5)
LYMPHOCYTES NFR BLD: 13 % (ref 12–49)
MCH RBC QN AUTO: 28.3 PG (ref 26–34)
MCHC RBC AUTO-ENTMCNC: 32.1 G/DL (ref 30–36.5)
MCV RBC AUTO: 88.3 FL (ref 80–99)
MONOCYTES # BLD: 1.2 K/UL (ref 0–1)
MONOCYTES NFR BLD: 8 % (ref 5–13)
NEUTS SEG # BLD: 11.3 K/UL (ref 1.8–8)
NEUTS SEG NFR BLD: 77 % (ref 32–75)
NRBC # BLD: 0 K/UL (ref 0–0.01)
NRBC BLD-RTO: 0 PER 100 WBC
PLATELET # BLD AUTO: 260 K/UL (ref 150–400)
PMV BLD AUTO: 11.5 FL (ref 8.9–12.9)
RBC # BLD AUTO: 3.85 M/UL (ref 3.8–5.2)
WBC # BLD AUTO: 14.7 K/UL (ref 3.6–11)

## 2019-08-17 PROCEDURE — 85025 COMPLETE CBC W/AUTO DIFF WBC: CPT

## 2019-08-17 PROCEDURE — 65410000002 HC RM PRIVATE OB

## 2019-08-17 PROCEDURE — 74011250636 HC RX REV CODE- 250/636: Performed by: OBSTETRICS & GYNECOLOGY

## 2019-08-17 PROCEDURE — 74011250637 HC RX REV CODE- 250/637: Performed by: OBSTETRICS & GYNECOLOGY

## 2019-08-17 PROCEDURE — 36415 COLL VENOUS BLD VENIPUNCTURE: CPT

## 2019-08-17 RX ADMIN — IBUPROFEN 800 MG: 400 TABLET, FILM COATED ORAL at 08:25

## 2019-08-17 RX ADMIN — LABETALOL HYDROCHLORIDE 200 MG: 200 TABLET, FILM COATED ORAL at 08:25

## 2019-08-17 RX ADMIN — SODIUM CHLORIDE, SODIUM LACTATE, POTASSIUM CHLORIDE, AND CALCIUM CHLORIDE 125 ML/HR: 600; 310; 30; 20 INJECTION, SOLUTION INTRAVENOUS at 04:36

## 2019-08-17 RX ADMIN — IBUPROFEN 800 MG: 400 TABLET, FILM COATED ORAL at 00:33

## 2019-08-17 RX ADMIN — IBUPROFEN 800 MG: 400 TABLET, FILM COATED ORAL at 17:45

## 2019-08-17 RX ADMIN — LABETALOL HYDROCHLORIDE 200 MG: 200 TABLET, FILM COATED ORAL at 23:21

## 2019-08-17 NOTE — PROGRESS NOTES
Post-Operative  Day 1    Valerie Urenaarchana     Assessment: Post-Op day 1, stable    Plan:   1. Routine post-operative care   2. CHTN- continue Labetalol and monitor BPs- normal to date   3. Decline circumcision\   4. Ankylosing spondylosis- restart Humira at home    Information for the patient's :  [place] mcintyre [[id number]]   , Low Transverse   Patient doing well without significant complaint. Nausea and vomiting resolved, tolerating liquids, no flatus, man in place. Vitals:  Visit Vitals  /87 (BP 1 Location: Left arm, BP Patient Position: Post activity)   Pulse (!) 103   Temp 98.3 °F (36.8 °C)   Resp 16   Ht 5' 10\" (1.778 m)   Wt 108 kg (238 lb)   SpO2 98%   Breastfeeding? Yes   BMI 34.15 kg/m²     Temp (24hrs), Av.5 °F (36.4 °C), Min:96.6 °F (35.9 °C), Max:98.3 °F (36.8 °C)      Last 24hr Input/Output:    Intake/Output Summary (Last 24 hours) at 2019 0850  Last data filed at 2019 0700  Gross per 24 hour   Intake 5918.88 ml   Output 3210 ml   Net 2708.88 ml          Exam:        Patient without distress. Lungs clear. Abdomen, bowel sounds present, soft, expected tenderness, fundus firm Wound dressing intact     Perineum normal lochia noted               Lower extremities are negative for swelling, cords or tenderness.     Labs:   Lab Results   Component Value Date/Time    WBC 14.7 (H) 2019 04:17 AM    WBC 13.6 (H) 2019 08:35 AM    WBC 13.3 (H) 2015 03:51 AM    WBC 13.4 (H) 2015 06:27 AM    HGB 10.9 (L) 2019 04:17 AM    HGB 12.5 2019 08:35 AM    HGB 10.5 (L) 2015 03:51 AM    HGB 11.9 2015 06:27 AM    HCT 34.0 (L) 2019 04:17 AM    HCT 38.2 2019 08:35 AM    HCT 31.9 (L) 2015 03:51 AM    HCT 35.8 2015 06:27 AM    PLATELET 116  04:17 AM    PLATELET 913  08:35 AM    PLATELET 506  03:51 AM    PLATELET 367  06:27 AM       Recent Results (from the past 24 hour(s))   METABOLIC PANEL, COMPREHENSIVE    Collection Time: 08/16/19  9:36 AM   Result Value Ref Range    Sodium 138 136 - 145 mmol/L    Potassium 4.1 3.5 - 5.1 mmol/L    Chloride 107 97 - 108 mmol/L    CO2 23 21 - 32 mmol/L    Anion gap 8 5 - 15 mmol/L    Glucose 87 65 - 100 mg/dL    BUN 10 6 - 20 MG/DL    Creatinine 0.65 0.55 - 1.02 MG/DL    BUN/Creatinine ratio 15 12 - 20      GFR est AA >60 >60 ml/min/1.73m2    GFR est non-AA >60 >60 ml/min/1.73m2    Calcium 9.7 8.5 - 10.1 MG/DL    Bilirubin, total 0.4 0.2 - 1.0 MG/DL    ALT (SGPT) 17 12 - 78 U/L    AST (SGOT) 22 15 - 37 U/L    Alk. phosphatase 112 45 - 117 U/L    Protein, total 8.0 6.4 - 8.2 g/dL    Albumin 3.2 (L) 3.5 - 5.0 g/dL    Globulin 4.8 (H) 2.0 - 4.0 g/dL    A-G Ratio 0.7 (L) 1.1 - 2.2     CBC WITH AUTOMATED DIFF    Collection Time: 08/17/19  4:17 AM   Result Value Ref Range    WBC 14.7 (H) 3.6 - 11.0 K/uL    RBC 3.85 3.80 - 5.20 M/uL    HGB 10.9 (L) 11.5 - 16.0 g/dL    HCT 34.0 (L) 35.0 - 47.0 %    MCV 88.3 80.0 - 99.0 FL    MCH 28.3 26.0 - 34.0 PG    MCHC 32.1 30.0 - 36.5 g/dL    RDW 14.8 (H) 11.5 - 14.5 %    PLATELET 318 730 - 341 K/uL    MPV 11.5 8.9 - 12.9 FL    NRBC 0.0 0  WBC    ABSOLUTE NRBC 0.00 0.00 - 0.01 K/uL    NEUTROPHILS 77 (H) 32 - 75 %    LYMPHOCYTES 13 12 - 49 %    MONOCYTES 8 5 - 13 %    EOSINOPHILS 1 0 - 7 %    BASOPHILS 0 0 - 1 %    IMMATURE GRANULOCYTES 1 (H) 0.0 - 0.5 %    ABS. NEUTROPHILS 11.3 (H) 1.8 - 8.0 K/UL    ABS. LYMPHOCYTES 1.9 0.8 - 3.5 K/UL    ABS. MONOCYTES 1.2 (H) 0.0 - 1.0 K/UL    ABS. EOSINOPHILS 0.1 0.0 - 0.4 K/UL    ABS. BASOPHILS 0.1 0.0 - 0.1 K/UL    ABS. IMM.  GRANS. 0.1 (H) 0.00 - 0.04 K/UL    DF AUTOMATED

## 2019-08-17 NOTE — PROGRESS NOTES
Assisted to shower. Bed linens and pt. Gown changed. Assisted back to bed. Cbc drawn and sent to lab.

## 2019-08-17 NOTE — PROGRESS NOTES
Bedside and Verbal shift change report given to RN Efraín Cristobal (oncoming nurse) by Mo Allen RN  (offgoing nurse). Report given with Remedios WILSON and MAR.

## 2019-08-17 NOTE — PROGRESS NOTES
Duramorph Follow-Up Note    1 Day Post-Op sp Procedure(s):   SECTION. /70 (BP 1 Location: Left arm, BP Patient Position: At rest)   Pulse 94   Temp 36.7 °C (98.1 °F)   Resp 18   Ht 5' 10\" (1.778 m)   Wt 108 kg (238 lb)   SpO2 98%   Breastfeeding? Yes   BMI 34.15 kg/m² . Patient is POD-1 S/P intrathecal duramorph. Pain is well controlled  Patient reports no headache, fever, weakness or numbness. Epidural/spinal tap site is clean, dry and intact. No obvious Anesthesia complications noted. Plan:    Pain management as per primary service.

## 2019-08-17 NOTE — PROGRESS NOTES
Bedside shift change report given to LORETO Alva RN (oncoming nurse) by [employer] (offgoing nurse). Report included the following information SBAR, Intake/Output, MAR and Recent Results.

## 2019-08-18 VITALS
DIASTOLIC BLOOD PRESSURE: 74 MMHG | BODY MASS INDEX: 34.07 KG/M2 | SYSTOLIC BLOOD PRESSURE: 122 MMHG | WEIGHT: 238 LBS | OXYGEN SATURATION: 98 % | RESPIRATION RATE: 17 BRPM | TEMPERATURE: 98 F | HEART RATE: 101 BPM | HEIGHT: 70 IN

## 2019-08-18 PROCEDURE — 74011250637 HC RX REV CODE- 250/637: Performed by: OBSTETRICS & GYNECOLOGY

## 2019-08-18 RX ORDER — IBUPROFEN 600 MG/1
600 TABLET ORAL
Qty: 30 TAB | Refills: 0 | Status: SHIPPED | OUTPATIENT
Start: 2019-08-18 | End: 2020-08-12

## 2019-08-18 RX ADMIN — LABETALOL HYDROCHLORIDE 200 MG: 200 TABLET, FILM COATED ORAL at 09:10

## 2019-08-18 RX ADMIN — IBUPROFEN 800 MG: 400 TABLET, FILM COATED ORAL at 02:43

## 2019-08-18 NOTE — DISCHARGE INSTRUCTIONS
POST DELIVERY DISCHARGE INSTRUCTIONS    Name: Nora Conception  YOB: 1985  Primary Diagnosis: Active Problems:    Previous  section (2019)        General:     Diet/Diet Restrictions:  · Eight 8-ounce glasses of fluid daily (water, juices); avoid excessive caffeine intake. · Meals/snacks as desired which are high in fiber and carbohydrates and low in fat and cholesterol. Medications:         Physical Activity / Restrictions / Safety:     · Avoid heavy lifting, no more that 8 lbs. For 2-3 weeks;   · Limit use of stairs to 2 times daily for the first week home. · No driving for one week. · Avoid intercourse 4-6 weeks, no douching or tampon use. · Check with obstetrician before starting or resuming an exercise program.      Discharge Instructions/Special Treatment/Home Care Needs:     · Continue prenatal vitamins. · Continue to use squirt bottle with warm water on your episiotomy after each bathroom use until bleeding stops. · If steri-strips applied to your incision, remove in 7-10 days. Call your doctor for the following:     · Fever over 101 degrees by mouth. · Vaginal bleeding heavier than a normal menstrual period or clots larger than a golf ball. · Red streaks or increased swelling of legs, painful red streaks on your breast.  · Painful urination, constipation and increased pain or swelling or discharge with your incision. · If you feel extremely anxious or overwhelmed. · If you have thoughts of harming yourself and/or your baby. Pain Management:     · Take Acetaminophen (Tylenol) or Ibuprofen (Advil, Motrin), as directed for pain. · Use a warm Sitz bath 3 times daily to relieve episiotomy or hemorrhoidal discomfort. · For hemorrhoidal discomfort, use Tucks and Anusol cream as needed and directed. · Heating pad to  incision as needed.      Follow-Up Care:     Appointment with MD:   Follow-up Appointments   Procedures    FOLLOW UP VISIT Appointment in: 6 Weeks With Dr. Johny King for postpartum check     With Dr. Johny King for postpartum check     Standing Status:   Standing     Number of Occurrences:   1     Order Specific Question:   Appointment in     Answer:   Nita Parkinson     Telephone number: [phone]    Signed By: Kaylie Colunga MD                                                                                                   Date: 8/18/2019 Time: 7:48 AM

## 2019-08-18 NOTE — DISCHARGE SUMMARY
Obstetrical Discharge Summary     Name: Halina Cobos MRN: [id number]  SSN: [id number]    YOB: 1985  Age: 29 y.o. Sex: female      Admit Date: 2019    Discharge Date: 2019     Admitting Physician: Elin Goncalves MD     Attending Physician:  Simona Varela MD     Admission Diagnoses: Previous  section [V66.291]    Discharge Diagnoses:   Information for the patient's :  Robel mcintyre [[id number]]   Delivery of a 3.9 kg male infant via , Low Transverse on 2019 at 10:45 AM  by Elin Goncalves. Apgars were 9  and 9 . Additional Diagnoses:   Hospital Problems  Never Reviewed          Codes Class Noted POA    Previous  section ICD-10-CM: Z98.891  ICD-9-CM: V45.89  2019 Unknown             Lab Results   Component Value Date/Time    Rubella, External 1.58 2019    GrBStrep, External neg 2019       Hospital Course: Normal hospital course following the delivery. Disposition at Discharge: Home or self care    Discharged Condition: Stable    Patient Instructions:   Current Discharge Medication List      START taking these medications    Details   ibuprofen (MOTRIN) 600 mg tablet Take 1 Tab by mouth every six (6) hours as needed for Pain for up to 360 days. Qty: 30 Tab, Refills: 0         CONTINUE these medications which have NOT CHANGED    Details   labetalol (NORMODYNE) 200 mg tablet Take 200 mg by mouth two (2) times a day. Indications: high blood pressure      PNV No12-Iron-FA-DSS-OM-3 29 mg iron-1 mg -50 mg CPKD Take 1 Tab by mouth daily. Indications: pregnancy      omeprazole (PRILOSEC) 10 mg capsule Take 10 mg by mouth daily. Indications: heartburn      ergocalciferol (VITAMIN D2) 50,000 unit capsule Take 50,000 Units by mouth every seven (7) days. Indications: Vitamin D Deficiency (High Dose Therapy)      certolizumab pegol (CIMZIA SC) by SubCUTAneous route every thirty (30) days.  Indications: rheumatic disease causing pain & stiffness in backbone      gabapentin (NEURONTIN) 300 mg capsule Take 300 mg by mouth two (2) times a day. hydroCHLOROthiazide (HYDRODIURIL) 25 mg tablet Take 25 mg by mouth daily. butalbital-acetaminophen-caff (FIORICET) -40 mg per capsule Take 1 Cap by mouth every four (4) hours as needed for Pain. Max Daily Amount: 6 Caps. Qty: 10 Cap, Refills: 0      metoclopramide HCl (REGLAN) 10 mg tablet Take 1 Tab by mouth every six (6) hours as needed. Qty: 12 Tab, Refills: 0             Reference my discharge instructions.     Follow-up Appointments   Procedures    FOLLOW UP VISIT Appointment in: 10 Weeks With Dr. Carolina Wiggins for postpartum check     With Dr. Carolina Wiggins for postpartum check     Standing Status:   Standing     Number of Occurrences:   1     Order Specific Question:   Appointment in     Answer:   6 Weeks        Signed By:  Dipti Martinez MD     August 18, 2019

## 2019-08-18 NOTE — PROGRESS NOTES
Post-Operative  Day 2    Alton Kat       Assessment: Post-Op day 2, doing well and desiring discharge today    Plan:   1. Discharge home today  2. Follow up in office in 6 weeks with Arslan Pérez MD  3. Post partum activity/wound care advised, diet as tolerated  4. Discharge Medications: ibuprofen and medications prior to admission  5. CHTN- well controlled on Labetalol, she will monitor BPs at home and call if >140/90    Information for the patient's :  Margret mcintyre [[id number]]   , Low Transverse   Patient doing well without significant complaint. Tolerating diet, passing flatus, voiding and ambulating without difficulty    Vitals:  Visit Vitals  /74   Pulse (!) 101   Temp 98 °F (36.7 °C)   Resp 17   Ht 5' 10\" (1.778 m)   Wt 108 kg (238 lb)   SpO2 98%   Breastfeeding? Yes   BMI 34.15 kg/m²     Temp (24hrs), Av °F (36.7 °C), Min:97.4 °F (36.3 °C), Max:98.3 °F (36.8 °C)        Exam:        Patient without distress. Abdomen, bowel sounds present, soft, expected tenderness, fundus firm                Wound incision clean, dry and intact               Lower extremities are negative for swelling, cords or tenderness. Labs:   Lab Results   Component Value Date/Time    WBC 14.7 (H) 2019 04:17 AM    WBC 13.6 (H) 2019 08:35 AM    WBC 13.3 (H) 2015 03:51 AM    WBC 13.4 (H) 2015 06:27 AM    HGB 10.9 (L) 2019 04:17 AM    HGB 12.5 2019 08:35 AM    HGB 10.5 (L) 2015 03:51 AM    HGB 11.9 2015 06:27 AM    HCT 34.0 (L) 2019 04:17 AM    HCT 38.2 2019 08:35 AM    HCT 31.9 (L) 2015 03:51 AM    HCT 35.8 2015 06:27 AM    PLATELET 829  04:17 AM    PLATELET 266  08:35 AM    PLATELET 354  03:51 AM    PLATELET 916  06:27 AM       No results found for this or any previous visit (from the past 24 hour(s)).

## 2019-08-18 NOTE — ROUTINE PROCESS
Patient discharge prescription & instructions given. Verbalized understanding. All questions answered. No distress noted. Signed copy of discharge instructions on paper chart. Pt instructed to follow up with OB in 3-5 days for EDY dressing removal, sooner if needed.

## 2019-08-27 ENCOUNTER — HOSPITAL ENCOUNTER (OUTPATIENT)
Dept: CT IMAGING | Age: 34
Discharge: HOME | End: 2019-08-27
Attending: OBSTETRICS & GYNECOLOGY
Payer: MEDICARE

## 2019-08-27 DIAGNOSIS — R10.32 ABDOMINAL PAIN, LEFT LOWER QUADRANT: ICD-10-CM

## 2019-08-27 PROCEDURE — 74011000255 HC RX REV CODE- 255: Performed by: OBSTETRICS & GYNECOLOGY

## 2019-08-27 PROCEDURE — 74011636320 HC RX REV CODE- 636/320: Performed by: OBSTETRICS & GYNECOLOGY

## 2019-08-27 PROCEDURE — 74177 CT ABD & PELVIS W/CONTRAST: CPT

## 2019-08-27 RX ORDER — BARIUM SULFATE 20 MG/ML
900 SUSPENSION ORAL
Status: COMPLETED | OUTPATIENT
Start: 2019-08-27 | End: 2019-08-27

## 2019-08-27 RX ORDER — SODIUM CHLORIDE 0.9 % (FLUSH) 0.9 %
10 SYRINGE (ML) INJECTION
Status: COMPLETED | OUTPATIENT
Start: 2019-08-27 | End: 2019-08-27

## 2019-08-27 RX ADMIN — IOPAMIDOL 100 ML: 755 INJECTION, SOLUTION INTRAVENOUS at 14:22

## 2019-08-27 RX ADMIN — BARIUM SULFATE 900 ML: 21 SUSPENSION ORAL at 14:22

## 2019-08-27 RX ADMIN — Medication 10 ML: at 14:22

## 2019-09-12 ENCOUNTER — OFFICE VISIT (OUTPATIENT)
Dept: SURGERY | Age: 34
End: 2019-09-12

## 2019-09-12 VITALS
HEART RATE: 74 BPM | DIASTOLIC BLOOD PRESSURE: 91 MMHG | WEIGHT: 220 LBS | RESPIRATION RATE: 16 BRPM | HEIGHT: 70 IN | BODY MASS INDEX: 31.5 KG/M2 | TEMPERATURE: 98.4 F | OXYGEN SATURATION: 96 % | SYSTOLIC BLOOD PRESSURE: 142 MMHG

## 2019-09-12 DIAGNOSIS — S30.1XXA ABDOMINAL WALL SEROMA, INITIAL ENCOUNTER: Primary | ICD-10-CM

## 2019-09-12 NOTE — PROGRESS NOTES
Chief Complaint   Patient presents with    Abdominal Pain     Patient states since her c section 4 weeks ago she having LLQ pain. Patient referred by Dr. Alba Junior. 1. Have you been to the ER, urgent care clinic since your last visit? Hospitalized since your last visit? No    2. Have you seen or consulted any other health care providers outside of the [address] [place] [address] since your last visit? Include any pap smears or colon screening.  No

## 2019-09-12 NOTE — Clinical Note
10/23/19 Patient: Sandy Dancer YOB: 1985 Date of Visit: 9/12/2019 Violetta Medina NP 
[address] [place] [address] [id number] [phone] VIA Outside Provider Messaging Dear Violetta Medina NP, Thank you for referring Ms. Tanja Tovar to [address] [place] [address] for evaluation. My notes for this consultation are attached. If you have questions, please do not hesitate to call me. I look forward to following your patient along with you.  
 
 
Sincerely, 
 
Primitivo Estrella MD

## 2019-09-17 ENCOUNTER — HOSPITAL ENCOUNTER (EMERGENCY)
Age: 34
Discharge: HOME | End: 2019-09-17
Attending: EMERGENCY MEDICINE
Payer: MEDICARE

## 2019-09-17 ENCOUNTER — APPOINTMENT (OUTPATIENT)
Dept: ULTRASOUND IMAGING | Age: 34
End: 2019-09-17
Attending: EMERGENCY MEDICINE
Payer: MEDICARE

## 2019-09-17 VITALS
SYSTOLIC BLOOD PRESSURE: 156 MMHG | WEIGHT: 223.99 LBS | HEART RATE: 87 BPM | DIASTOLIC BLOOD PRESSURE: 88 MMHG | BODY MASS INDEX: 33.95 KG/M2 | TEMPERATURE: 99.3 F | HEIGHT: 68 IN | OXYGEN SATURATION: 98 % | RESPIRATION RATE: 20 BRPM

## 2019-09-17 DIAGNOSIS — G89.18 POST-OPERATIVE PAIN: Primary | ICD-10-CM

## 2019-09-17 LAB
ALBUMIN SERPL-MCNC: 3.7 G/DL (ref 3.5–5)
ALBUMIN/GLOB SERPL: 0.9 {RATIO} (ref 1.1–2.2)
ALP SERPL-CCNC: 71 U/L (ref 45–117)
ALT SERPL-CCNC: 57 U/L (ref 12–78)
ANION GAP SERPL CALC-SCNC: 4 MMOL/L (ref 5–15)
APPEARANCE UR: CLEAR
AST SERPL-CCNC: 34 U/L (ref 15–37)
BACTERIA URNS QL MICRO: NEGATIVE /HPF
BASOPHILS # BLD: 0.1 K/UL (ref 0–0.1)
BASOPHILS NFR BLD: 1 % (ref 0–1)
BILIRUB SERPL-MCNC: 0.2 MG/DL (ref 0.2–1)
BILIRUB UR QL: NEGATIVE
BUN SERPL-MCNC: 14 MG/DL (ref 6–20)
BUN/CREAT SERPL: 13 (ref 12–20)
CALCIUM SERPL-MCNC: 8.7 MG/DL (ref 8.5–10.1)
CHLORIDE SERPL-SCNC: 106 MMOL/L (ref 97–108)
CO2 SERPL-SCNC: 30 MMOL/L (ref 21–32)
COLOR UR: ABNORMAL
CREAT SERPL-MCNC: 1.08 MG/DL (ref 0.55–1.02)
DIFFERENTIAL METHOD BLD: ABNORMAL
EOSINOPHIL # BLD: 0.9 K/UL (ref 0–0.4)
EOSINOPHIL NFR BLD: 9 % (ref 0–7)
EPITH CASTS URNS QL MICRO: ABNORMAL /LPF
ERYTHROCYTE [DISTWIDTH] IN BLOOD BY AUTOMATED COUNT: 13.2 % (ref 11.5–14.5)
GLOBULIN SER CALC-MCNC: 4.3 G/DL (ref 2–4)
GLUCOSE SERPL-MCNC: 113 MG/DL (ref 65–100)
GLUCOSE UR STRIP.AUTO-MCNC: NEGATIVE MG/DL
HCT VFR BLD AUTO: 38.1 % (ref 35–47)
HGB BLD-MCNC: 12.9 G/DL (ref 11.5–16)
HGB UR QL STRIP: NEGATIVE
IMM GRANULOCYTES # BLD AUTO: 0 K/UL (ref 0–0.04)
IMM GRANULOCYTES NFR BLD AUTO: 0 % (ref 0–0.5)
KETONES UR QL STRIP.AUTO: NEGATIVE MG/DL
LEUKOCYTE ESTERASE UR QL STRIP.AUTO: ABNORMAL
LYMPHOCYTES # BLD: 2.8 K/UL (ref 0.8–3.5)
LYMPHOCYTES NFR BLD: 28 % (ref 12–49)
MCH RBC QN AUTO: 29.2 PG (ref 26–34)
MCHC RBC AUTO-ENTMCNC: 33.9 G/DL (ref 30–36.5)
MCV RBC AUTO: 86.2 FL (ref 80–99)
MONOCYTES # BLD: 0.7 K/UL (ref 0–1)
MONOCYTES NFR BLD: 7 % (ref 5–13)
NEUTS SEG # BLD: 5.8 K/UL (ref 1.8–8)
NEUTS SEG NFR BLD: 55 % (ref 32–75)
NITRITE UR QL STRIP.AUTO: NEGATIVE
NRBC # BLD: 0 K/UL (ref 0–0.01)
NRBC BLD-RTO: 0 PER 100 WBC
PH UR STRIP: 6 [PH] (ref 5–8)
PLATELET # BLD AUTO: 336 K/UL (ref 150–400)
PMV BLD AUTO: 11.1 FL (ref 8.9–12.9)
POTASSIUM SERPL-SCNC: 3.7 MMOL/L (ref 3.5–5.1)
PROT SERPL-MCNC: 8 G/DL (ref 6.4–8.2)
PROT UR STRIP-MCNC: NEGATIVE MG/DL
RBC # BLD AUTO: 4.42 M/UL (ref 3.8–5.2)
RBC #/AREA URNS HPF: ABNORMAL /HPF (ref 0–5)
SODIUM SERPL-SCNC: 140 MMOL/L (ref 136–145)
SP GR UR REFRACTOMETRY: 1.02 (ref 1–1.03)
UA: UC IF INDICATED,UAUC: ABNORMAL
UROBILINOGEN UR QL STRIP.AUTO: 1 EU/DL (ref 0.2–1)
WBC # BLD AUTO: 10.3 K/UL (ref 3.6–11)
WBC URNS QL MICRO: ABNORMAL /HPF (ref 0–4)

## 2019-09-17 PROCEDURE — 81001 URINALYSIS AUTO W/SCOPE: CPT

## 2019-09-17 PROCEDURE — 85025 COMPLETE CBC W/AUTO DIFF WBC: CPT

## 2019-09-17 PROCEDURE — 80053 COMPREHEN METABOLIC PANEL: CPT

## 2019-09-17 PROCEDURE — 36415 COLL VENOUS BLD VENIPUNCTURE: CPT

## 2019-09-17 PROCEDURE — 74011250637 HC RX REV CODE- 250/637: Performed by: EMERGENCY MEDICINE

## 2019-09-17 PROCEDURE — 99283 EMERGENCY DEPT VISIT LOW MDM: CPT

## 2019-09-17 PROCEDURE — 76705 ECHO EXAM OF ABDOMEN: CPT

## 2019-09-17 RX ORDER — HYDROCODONE BITARTRATE AND ACETAMINOPHEN 5; 325 MG/1; MG/1
2 TABLET ORAL
Status: COMPLETED | OUTPATIENT
Start: 2019-09-17 | End: 2019-09-17

## 2019-09-17 RX ORDER — HYDROCODONE BITARTRATE AND ACETAMINOPHEN 5; 325 MG/1; MG/1
2 TABLET ORAL
Qty: 15 TAB | Refills: 0 | Status: SHIPPED | OUTPATIENT
Start: 2019-09-17 | End: 2019-09-22

## 2019-09-17 RX ADMIN — HYDROCODONE BITARTRATE AND ACETAMINOPHEN 2 TABLET: 5; 325 TABLET ORAL at 21:11

## 2019-09-18 NOTE — DISCHARGE INSTRUCTIONS
You can safely continue to breast feed while taking Norco as prescribed. If your child appears unusually drowsy or lethargic you should stop Norco or pump and dump the milk.

## 2019-09-18 NOTE — ED PROVIDER NOTES
EMERGENCY DEPARTMENT HISTORY AND PHYSICAL EXAM      Date: 2019  Patient Name: Jacquelin Michel  Patient Age and Sex: 29 y.o. female    History of Presenting Illness     Chief Complaint   Patient presents with    Abdominal Pain     reports LLQ pain x 1 month s/p ; reports that she has been seen in ED, as well as with OB, and had CT scan performed, followed up with GI; pain has persisted; reports that this wekend she felt a \"pop\" and all pain subsided, then returned 1 day later; also reports cloudy urine    Diarrhea     x 1 month       History Provided By: Patient and Patient's     Ability to gather history was limited by: none    HPI: Jacquelin Michel, 29 y.o. female c/o severe, sharp pain at focal area at the left lateral corner of her  incision. Pt underwent  one month ago, uncomplicated. After discharge she developed severe pain in that area, with small palpable firm nodule or cyst at corner of incision. Has been seen by her OB as well as a surgeon over past few weeks, had CT scan and U/S. Diagnosis thus far has been post op seroma, no abscess. Pt reports pain is severe despite trying Percocet, lidocaine patch, etc.  No fevers. Pain worsened by standing/walking/laying flat. No N/V. Normal BM's, perhaps loose at times. Pt denies any other alleviating or exacerbating factors. There are no other complaints, changes or physical findings at this time.      Past Medical History:   Diagnosis Date    Genital herpes, unspecified     pt reports \"maybe now\"     Thyroid activity decreased     as a teen hypothyroidism     Past Surgical History:   Procedure Laterality Date    HX  SECTION      HX CHOLECYSTECTOMY      HX GYN      c- section    HX TONSILLECTOMY         PCP: Woo Saeed NP    Past History   Past Medical History:  Past Medical History:   Diagnosis Date    Genital herpes, unspecified     pt reports \"maybe now\"     Thyroid activity decreased     as a teen hypothyroidism       Past Surgical History:  Past Surgical History:   Procedure Laterality Date    HX  SECTION  2019    HX CHOLECYSTECTOMY      HX GYN  2015    c- section    HX TONSILLECTOMY         Family History:  Family History   Problem Relation Age of Onset    Diabetes Mother     Hypertension Mother     Muscular dystrophy Other        Social History:  Social History     Tobacco Use    Smoking status: Never Smoker    Smokeless tobacco: Never Used   Substance Use Topics    Alcohol use: No    Drug use: No       Allergies: Allergies   Allergen Reactions    Erythromycin Swelling    Penicillins Swelling    Sulfa (Sulfonamide Antibiotics) Swelling       Current Medications:  No current facility-administered medications on file prior to encounter. Current Outpatient Medications on File Prior to Encounter   Medication Sig Dispense Refill    ibuprofen (MOTRIN) 600 mg tablet Take 1 Tab by mouth every six (6) hours as needed for Pain for up to 360 days. 30 Tab 0    labetalol (NORMODYNE) 200 mg tablet Take 200 mg by mouth two (2) times a day. Indications: high blood pressure      PNV No12-Iron-FA-DSS-OM-3 29 mg iron-1 mg -50 mg CPKD Take 1 Tab by mouth daily. Indications: pregnancy      omeprazole (PRILOSEC) 10 mg capsule Take 10 mg by mouth daily. Indications: heartburn      ergocalciferol (VITAMIN D2) 50,000 unit capsule Take 50,000 Units by mouth every seven (7) days. Indications: Vitamin D Deficiency (High Dose Therapy)      certolizumab pegol (CIMZIA SC) by SubCUTAneous route every thirty (30) days. Indications: rheumatic disease causing pain & stiffness in backbone      gabapentin (NEURONTIN) 300 mg capsule Take 300 mg by mouth two (2) times a day.  hydroCHLOROthiazide (HYDRODIURIL) 25 mg tablet Take 25 mg by mouth daily.       butalbital-acetaminophen-caff (FIORICET) -40 mg per capsule Take 1 Cap by mouth every four (4) hours as needed for Pain. Max Daily Amount: 6 Caps. 10 Cap 0    metoclopramide HCl (REGLAN) 10 mg tablet Take 1 Tab by mouth every six (6) hours as needed. 12 Tab 0       Review of Systems   Review of Systems   Constitutional: Negative for fever. All other systems reviewed and are negative. Physical Exam   Vital Signs  Patient Vitals for the past 24 hrs:   Temp Pulse Resp BP SpO2   09/17/19 2139 -- 87 20 156/88 98 %   09/17/19 2010 99.3 °F (37.4 °C) 82 18 (!) 213/122 98 %       Physical Exam   Constitutional: She is oriented to person, place, and time. She appears well-developed and well-nourished. No distress. Obese, tearful   HENT:   Head: Normocephalic and atraumatic. Eyes: Conjunctivae are normal. Right eye exhibits no discharge. Left eye exhibits no discharge. Neck: Normal range of motion. Neck supple. Cardiovascular: Normal rate, regular rhythm and normal heart sounds. No murmur heard. Pulmonary/Chest: Effort normal and breath sounds normal. No respiratory distress. She has no wheezes. Abdominal: Soft. She exhibits no distension. There is no tenderness. There is no rigidity and no guarding. Small, focal palpable cyst or nodule at left corner of incision, focally tender, no erythema or induration or fluctuance   Musculoskeletal: Normal range of motion. She exhibits no deformity. Neurological: She is alert and oriented to person, place, and time. Skin: Skin is warm and dry. No rash noted. Psychiatric: Her behavior is normal. Thought content normal. Her mood appears anxious. Nursing note and vitals reviewed.       Diagnostic Study Results   Labs  Recent Results (from the past 24 hour(s))   URINALYSIS W/ REFLEX CULTURE    Collection Time: 09/17/19  8:54 PM   Result Value Ref Range    Color YELLOW/STRAW      Appearance CLEAR CLEAR      Specific gravity 1.023 1.003 - 1.030      pH (UA) 6.0 5.0 - 8.0      Protein NEGATIVE  NEG mg/dL    Glucose NEGATIVE  NEG mg/dL    Ketone NEGATIVE  NEG mg/dL Bilirubin NEGATIVE  NEG      Blood NEGATIVE  NEG      Urobilinogen 1.0 0.2 - 1.0 EU/dL    Nitrites NEGATIVE  NEG      Leukocyte Esterase SMALL (A) NEG      WBC 0-4 0 - 4 /hpf    RBC 0-5 0 - 5 /hpf    Epithelial cells FEW FEW /lpf    Bacteria NEGATIVE  NEG /hpf    UA:UC IF INDICATED CULTURE NOT INDICATED BY UA RESULT CNI     CBC WITH AUTOMATED DIFF    Collection Time: 09/17/19  9:17 PM   Result Value Ref Range    WBC 10.3 3.6 - 11.0 K/uL    RBC 4.42 3.80 - 5.20 M/uL    HGB 12.9 11.5 - 16.0 g/dL    HCT 38.1 35.0 - 47.0 %    MCV 86.2 80.0 - 99.0 FL    MCH 29.2 26.0 - 34.0 PG    MCHC 33.9 30.0 - 36.5 g/dL    RDW 13.2 11.5 - 14.5 %    PLATELET 023 181 - 887 K/uL    MPV 11.1 8.9 - 12.9 FL    NRBC 0.0 0  WBC    ABSOLUTE NRBC 0.00 0.00 - 0.01 K/uL    NEUTROPHILS 55 32 - 75 %    LYMPHOCYTES 28 12 - 49 %    MONOCYTES 7 5 - 13 %    EOSINOPHILS 9 (H) 0 - 7 %    BASOPHILS 1 0 - 1 %    IMMATURE GRANULOCYTES 0 0.0 - 0.5 %    ABS. NEUTROPHILS 5.8 1.8 - 8.0 K/UL    ABS. LYMPHOCYTES 2.8 0.8 - 3.5 K/UL    ABS. MONOCYTES 0.7 0.0 - 1.0 K/UL    ABS. EOSINOPHILS 0.9 (H) 0.0 - 0.4 K/UL    ABS. BASOPHILS 0.1 0.0 - 0.1 K/UL    ABS. IMM. GRANS. 0.0 0.00 - 0.04 K/UL    DF AUTOMATED     METABOLIC PANEL, COMPREHENSIVE    Collection Time: 09/17/19  9:17 PM   Result Value Ref Range    Sodium 140 136 - 145 mmol/L    Potassium 3.7 3.5 - 5.1 mmol/L    Chloride 106 97 - 108 mmol/L    CO2 30 21 - 32 mmol/L    Anion gap 4 (L) 5 - 15 mmol/L    Glucose 113 (H) 65 - 100 mg/dL    BUN 14 6 - 20 MG/DL    Creatinine 1.08 (H) 0.55 - 1.02 MG/DL    BUN/Creatinine ratio 13 12 - 20      GFR est AA >60 >60 ml/min/1.73m2    GFR est non-AA 58 (L) >60 ml/min/1.73m2    Calcium 8.7 8.5 - 10.1 MG/DL    Bilirubin, total 0.2 0.2 - 1.0 MG/DL    ALT (SGPT) 57 12 - 78 U/L    AST (SGOT) 34 15 - 37 U/L    Alk.  phosphatase 71 45 - 117 U/L    Protein, total 8.0 6.4 - 8.2 g/dL    Albumin 3.7 3.5 - 5.0 g/dL    Globulin 4.3 (H) 2.0 - 4.0 g/dL    A-G Ratio 0.9 (L) 1.1 - 2.2 Radiologic Studies  US ABD LTD   Final Result   IMPRESSION: Diminished abdominal wall fluid collection. This likely represents   resolving seroma or hematoma. CT Results  (Last 48 hours)    None        CXR Results  (Last 48 hours)    None          Procedures     Procedures    Medical Decision Making     Provider Notes (Medical Decision Making):   30 yo F with post-op pain. Labs and U/S reassuring, small improving post-op fluid collection. Does not appear to be abscess. Likely local inflammatory reaction to a suture. Switch to [place], try topical tiger balm or capsaicin cream, f/u OB. Elian Long MD  10:44 PM        Consult required? No      Medications Administered During ED Course:  Medications   HYDROcodone-acetaminophen (NORCO) 5-325 mg per tablet 2 Tab (2 Tabs Oral Given 9/17/19 2111)          Diagnosis     Disposition:  Discharged    Clinical Impression:   1. Post-operative pain        Attestation:  I personally performed the services described in this documentation on this date 9/17/2019 for patient J Luis Forbes. Elian Long MD        I was the first provider for this patient on this visit. To the best of my ability I reviewed relevant prior medical records, electrocardiograms, laboratories, and radiologic studies. The patient's presenting problems were discussed, and the patient was in agreement with the care plan formulated and outlined with them. Elian Long MD    Please note that this dictation was completed with Dragon voice recognition software. Quite often unanticipated grammatical, syntax, homophones, and other interpretive errors are inadvertently transcribed by the computer software. Please disregard these errors and excuse any errors that have escaped final proofreading.

## 2019-09-18 NOTE — ED NOTES
____Shola____________________ in to talk with patient and explain plan of care with  understanding and  written & verbal instructions.

## 2019-10-23 NOTE — PROGRESS NOTES
To: Jasmina Castellanos MD  CC:  Leyda Davila NP      From: Jada Pendleton MD    Thank you for sending Gissell Serum to see us. Encounter Date: 2019  History and Physical    Assessment:   Seroma at recent c/s incision. Body mass index is 31.57 kg/m². Comorbid asthma, GERD, ankylosing spondylitis, HTN. S/p above and GB, multiple laparoscopies. Plan/Recommendations/Medical Decision Making:   Discussed diagnosis of seroma. Explained there does not appear to be infection. Explained potential aspiration. She would like to hold off. Rec ice for discomfort, alt with moist heat to aid reabsorption. Explained that it will resolve on its own with time. HPI:   Patient is a 29 y.o. female who is seen in consultation at the request of Jasmina Castellanos for abdominal pain. The pain is located in the lower abdomen without radiation. Onset of symptoms was after recent c/s 19. Ended up going for CT because body habitus made exam difficult. Seroma noted. C/o pain, nausea. Past Medical History:   Diagnosis Date    Genital herpes, unspecified     pt reports \"maybe now\"     Thyroid activity decreased     as a teen hypothyroidism      Past Surgical History:   Procedure Laterality Date    HX  SECTION      HX CHOLECYSTECTOMY      HX GYN  2015    c- section    HX TONSILLECTOMY       Social History     Tobacco Use    Smoking status: Never Smoker    Smokeless tobacco: Never Used   Substance Use Topics    Alcohol use: No      Family History   Problem Relation Age of Onset    Diabetes Mother     Hypertension Mother     Muscular dystrophy Other        Current Outpatient Medications   Medication Sig    ibuprofen (MOTRIN) 600 mg tablet Take 1 Tab by mouth every six (6) hours as needed for Pain for up to 360 days.  labetalol (NORMODYNE) 200 mg tablet Take 200 mg by mouth two (2) times a day.  Indications: high blood pressure    PNV No12-Iron-FA-DSS-OM-3 29 mg iron-1 mg -50 mg CPKD Take 1 Tab by mouth daily. Indications: pregnancy    omeprazole (PRILOSEC) 10 mg capsule Take 10 mg by mouth daily. Indications: heartburn    ergocalciferol (VITAMIN D2) 50,000 unit capsule Take 50,000 Units by mouth every seven (7) days. Indications: Vitamin D Deficiency (High Dose Therapy)    certolizumab pegol (CIMZIA SC) by SubCUTAneous route every thirty (30) days. Indications: rheumatic disease causing pain & stiffness in backbone    gabapentin (NEURONTIN) 300 mg capsule Take 300 mg by mouth two (2) times a day.  hydroCHLOROthiazide (HYDRODIURIL) 25 mg tablet Take 25 mg by mouth daily.  butalbital-acetaminophen-caff (FIORICET) -40 mg per capsule Take 1 Cap by mouth every four (4) hours as needed for Pain. Max Daily Amount: 6 Caps.  metoclopramide HCl (REGLAN) 10 mg tablet Take 1 Tab by mouth every six (6) hours as needed. No current facility-administered medications for this visit. Allergies: Allergies   Allergen Reactions    Erythromycin Swelling    Penicillins Swelling    Sulfa (Sulfonamide Antibiotics) Swelling        Review of Systems:  10 systems reviewed. See scanned sheet in \"Media\" section. See HPI for pertinent positives and negatives. Objective:     Visit Vitals  BP (!) 142/91 (BP 1 Location: Right arm, BP Patient Position: Sitting)   Pulse 74   Temp 98.4 °F (36.9 °C) (Oral)   Resp 16   Ht 5' 10\" (1.778 m)   Wt 99.8 kg (220 lb)   SpO2 96%   BMI 31.57 kg/m²     General appearance  Alert, cooperative, no distress, appears stated age   HEENT  Anicteric   Neck Supple   Back   No CVA tenderness   Lungs   CTAB   Heart  Regular rate and rhythm. No murmur, rub or gallop   Abdomen   Soft. Bowel sounds normal. Obese. Well healed pfannenstiel. No palpable seroma. TTP on the left aspect. No erythema. No d/c. Extremities No CCE.    Pulses 2+ right radial   Skin Skin color, texture, turgor normal.    Lymph nodes No palpable groin lymphadenopathy.    Neurologic Without overt sensory or motor deficit       Signed By: Eleazar Oneill MD     October 23, 2019
